# Patient Record
Sex: FEMALE | Race: BLACK OR AFRICAN AMERICAN | NOT HISPANIC OR LATINO | Employment: FULL TIME | URBAN - METROPOLITAN AREA
[De-identification: names, ages, dates, MRNs, and addresses within clinical notes are randomized per-mention and may not be internally consistent; named-entity substitution may affect disease eponyms.]

---

## 2017-08-14 LAB
BILIRUB SERPL-MCNC: NEGATIVE MG/DL
BLOOD URINE, POC: NEGATIVE
GLUCOSE UR QL STRIP: NEGATIVE
KETONES UR QL STRIP: NEGATIVE
LEUKOCYTE EST, POC UA: NEGATIVE
NITRITE, POC UA: NEGATIVE
PH, POC UA: 5
POC BETA-HCG (QUAL): NEGATIVE
PROTEIN, POC: NEGATIVE
SPECIFIC GRAVITY, POC UA: 1.02
UROBILINOGEN, POC UA: NORMAL

## 2018-08-27 LAB — POC BETA-HCG (QUAL): NEGATIVE

## 2018-08-28 ENCOUNTER — HISTORICAL (OUTPATIENT)
Dept: LAB | Facility: HOSPITAL | Age: 33
End: 2018-08-28

## 2018-10-04 ENCOUNTER — HISTORICAL (OUTPATIENT)
Dept: SURGERY | Facility: HOSPITAL | Age: 33
End: 2018-10-04

## 2018-10-04 LAB
ABS NEUT (OLG): 3.7 X10(3)/MCL (ref 1.5–6.9)
ALBUMIN SERPL-MCNC: 3.7 GM/DL (ref 3.4–5)
ALBUMIN/GLOB SERPL: 1 RATIO
ALP SERPL-CCNC: 29 UNIT/L (ref 30–113)
ALT SERPL-CCNC: 23 UNIT/L (ref 10–45)
APTT PPP: 24.5 SECOND(S) (ref 25–35)
AST SERPL-CCNC: 24 UNIT/L (ref 15–37)
BASOPHILS # BLD AUTO: 0.1 X10(3)/MCL (ref 0–0.1)
BASOPHILS NFR BLD AUTO: 1 % (ref 0–1)
BILIRUB SERPL-MCNC: 0.3 MG/DL (ref 0.1–0.9)
BILIRUBIN DIRECT+TOT PNL SERPL-MCNC: 0.1 MG/DL (ref 0–0.3)
BILIRUBIN DIRECT+TOT PNL SERPL-MCNC: 0.2 MG/DL
BUN SERPL-MCNC: 8 MG/DL (ref 10–20)
CALCIUM SERPL-MCNC: 8.8 MG/DL (ref 8–10.5)
CHLORIDE SERPL-SCNC: 103 MMOL/L (ref 100–108)
CO2 SERPL-SCNC: 31 MMOL/L (ref 21–35)
CREAT SERPL-MCNC: 0.79 MG/DL (ref 0.7–1.3)
EOSINOPHIL # BLD AUTO: 0 X10(3)/MCL (ref 0–0.6)
EOSINOPHIL NFR BLD AUTO: 0 % (ref 0–5)
ERYTHROCYTE [DISTWIDTH] IN BLOOD BY AUTOMATED COUNT: 12.9 % (ref 11.5–17)
GLOBULIN SER-MCNC: 3.8 GM/DL
GLUCOSE SERPL-MCNC: 84 MG/DL (ref 75–116)
HCT VFR BLD AUTO: 43.9 % (ref 36–48)
HGB BLD-MCNC: 14 GM/DL (ref 12–16)
IMM GRANULOCYTES # BLD AUTO: 0.02 10*3/UL (ref 0–0.02)
IMM GRANULOCYTES NFR BLD AUTO: 0.3 % (ref 0–0.43)
INR PPP: 1 (ref 0–1.2)
LYMPHOCYTES # BLD AUTO: 2.7 X10(3)/MCL (ref 0.5–4.1)
LYMPHOCYTES NFR BLD AUTO: 40 % (ref 15–40)
MCH RBC QN AUTO: 28 PG (ref 27–34)
MCHC RBC AUTO-ENTMCNC: 32 GM/DL (ref 31–36)
MCV RBC AUTO: 87 FL (ref 80–99)
MONOCYTES # BLD AUTO: 0.4 X10(3)/MCL (ref 0–1.1)
MONOCYTES NFR BLD AUTO: 6 % (ref 4–12)
NEUTROPHILS # BLD AUTO: 3.7 X10(3)/MCL (ref 1.5–6.9)
NEUTROPHILS NFR BLD AUTO: 53 % (ref 43–75)
PLATELET # BLD AUTO: 289 X10(3)/MCL (ref 140–400)
PMV BLD AUTO: 11 FL (ref 6.8–10)
POTASSIUM SERPL-SCNC: 4.1 MMOL/L (ref 3.6–5.2)
PROT SERPL-MCNC: 7.5 GM/DL (ref 6.4–8.2)
PROTHROMBIN TIME: 10.3 SECOND(S) (ref 9–12)
RBC # BLD AUTO: 5.05 X10(6)/MCL (ref 4.2–5.4)
SODIUM SERPL-SCNC: 140 MMOL/L (ref 135–145)
WBC # SPEC AUTO: 6.9 X10(3)/MCL (ref 4.5–11.5)

## 2018-10-08 ENCOUNTER — HISTORICAL (OUTPATIENT)
Dept: ANESTHESIOLOGY | Facility: HOSPITAL | Age: 33
End: 2018-10-08

## 2018-10-08 LAB — B-HCG SERPL QL: NEGATIVE

## 2018-10-11 ENCOUNTER — HISTORICAL (OUTPATIENT)
Dept: MEDSURG UNIT | Facility: HOSPITAL | Age: 33
End: 2018-10-11

## 2019-03-22 ENCOUNTER — HISTORICAL (OUTPATIENT)
Dept: INTERNAL MEDICINE | Facility: CLINIC | Age: 34
End: 2019-03-22

## 2019-03-22 LAB
ABS NEUT (OLG): 2.49 X10(3)/MCL (ref 2.1–9.2)
ALBUMIN SERPL-MCNC: 3.8 GM/DL (ref 3.4–5)
ALBUMIN/GLOB SERPL: 1 RATIO (ref 1.1–2)
ALP SERPL-CCNC: 30 UNIT/L (ref 45–117)
ALT SERPL-CCNC: 15 UNIT/L (ref 12–78)
AST SERPL-CCNC: 15 UNIT/L (ref 15–37)
BASOPHILS # BLD AUTO: 0.04 X10(3)/MCL
BASOPHILS NFR BLD AUTO: 1 %
BILIRUB SERPL-MCNC: 0.4 MG/DL (ref 0.2–1)
BILIRUBIN DIRECT+TOT PNL SERPL-MCNC: 0.1 MG/DL
BILIRUBIN DIRECT+TOT PNL SERPL-MCNC: 0.3 MG/DL
BUN SERPL-MCNC: 12 MG/DL (ref 7–18)
CALCIUM SERPL-MCNC: 8.8 MG/DL (ref 8.5–10.1)
CHLORIDE SERPL-SCNC: 109 MMOL/L (ref 98–107)
CO2 SERPL-SCNC: 28 MMOL/L (ref 21–32)
CREAT SERPL-MCNC: 0.7 MG/DL (ref 0.6–1.3)
EOSINOPHIL # BLD AUTO: 0.09 X10(3)/MCL
EOSINOPHIL NFR BLD AUTO: 2 %
ERYTHROCYTE [DISTWIDTH] IN BLOOD BY AUTOMATED COUNT: 12.2 % (ref 11.5–14.5)
GLOBULIN SER-MCNC: 3.8 GM/ML (ref 2.3–3.5)
GLUCOSE SERPL-MCNC: 94 MG/DL (ref 74–106)
HCT VFR BLD AUTO: 40.9 % (ref 35–46)
HGB BLD-MCNC: 13.3 GM/DL (ref 12–16)
IMM GRANULOCYTES # BLD AUTO: 0.01 10*3/UL
IMM GRANULOCYTES NFR BLD AUTO: 0 %
LYMPHOCYTES # BLD AUTO: 1.89 X10(3)/MCL
LYMPHOCYTES NFR BLD AUTO: 38 % (ref 13–40)
MCH RBC QN AUTO: 28.3 PG (ref 26–34)
MCHC RBC AUTO-ENTMCNC: 32.5 GM/DL (ref 31–37)
MCV RBC AUTO: 87 FL (ref 80–100)
MONOCYTES # BLD AUTO: 0.4 X10(3)/MCL
MONOCYTES NFR BLD AUTO: 8 % (ref 4–12)
NEUTROPHILS # BLD AUTO: 2.49 X10(3)/MCL
NEUTROPHILS NFR BLD AUTO: 51 X10(3)/MCL
PLATELET # BLD AUTO: 219 X10(3)/MCL (ref 130–400)
PMV BLD AUTO: 10.5 FL (ref 7.4–10.4)
POTASSIUM SERPL-SCNC: 4.2 MMOL/L (ref 3.5–5.1)
PROT SERPL-MCNC: 7.6 GM/DL (ref 6.4–8.2)
RBC # BLD AUTO: 4.7 X10(6)/MCL (ref 4–5.2)
SODIUM SERPL-SCNC: 139 MMOL/L (ref 136–145)
T4 FREE SERPL-MCNC: 1.16 NG/DL (ref 0.76–1.46)
TSH SERPL-ACNC: 0.83 MIU/L (ref 0.36–3.74)
WBC # SPEC AUTO: 4.9 X10(3)/MCL (ref 4.5–11)

## 2019-04-23 ENCOUNTER — HISTORICAL (OUTPATIENT)
Dept: ADMINISTRATIVE | Facility: HOSPITAL | Age: 34
End: 2019-04-23

## 2019-10-08 ENCOUNTER — HISTORICAL (OUTPATIENT)
Dept: ADMINISTRATIVE | Facility: HOSPITAL | Age: 34
End: 2019-10-08

## 2020-01-22 ENCOUNTER — HISTORICAL (OUTPATIENT)
Dept: ADMINISTRATIVE | Facility: HOSPITAL | Age: 35
End: 2020-01-22

## 2020-05-14 LAB — POC BETA-HCG (QUAL): NEGATIVE

## 2020-05-28 ENCOUNTER — HISTORICAL (OUTPATIENT)
Dept: RADIOLOGY | Facility: HOSPITAL | Age: 35
End: 2020-05-28

## 2020-07-13 ENCOUNTER — HISTORICAL (OUTPATIENT)
Dept: RADIOLOGY | Facility: HOSPITAL | Age: 35
End: 2020-07-13

## 2021-01-11 ENCOUNTER — HISTORICAL (OUTPATIENT)
Dept: ADMINISTRATIVE | Facility: HOSPITAL | Age: 36
End: 2021-01-11

## 2021-01-27 ENCOUNTER — HISTORICAL (OUTPATIENT)
Dept: RADIOLOGY | Facility: HOSPITAL | Age: 36
End: 2021-01-27

## 2021-01-27 LAB
ABS NEUT (OLG): 2.76 X10(3)/MCL (ref 2.1–9.2)
ALBUMIN SERPL-MCNC: 4.1 GM/DL (ref 3.5–5)
ALBUMIN/GLOB SERPL: 1.2 RATIO (ref 1.1–2)
ALP SERPL-CCNC: 33 UNIT/L (ref 40–150)
ALT SERPL-CCNC: 11 UNIT/L (ref 0–55)
AST SERPL-CCNC: 28 UNIT/L (ref 5–34)
BASOPHILS # BLD AUTO: 0 X10(3)/MCL (ref 0–0.2)
BASOPHILS NFR BLD AUTO: 0 %
BILIRUB SERPL-MCNC: 0.5 MG/DL
BILIRUBIN DIRECT+TOT PNL SERPL-MCNC: 0.2 MG/DL (ref 0–0.5)
BILIRUBIN DIRECT+TOT PNL SERPL-MCNC: 0.3 MG/DL (ref 0–0.8)
BUN SERPL-MCNC: 10 MG/DL (ref 7–18.7)
CALCIUM SERPL-MCNC: 9.1 MG/DL (ref 8.4–10.2)
CHLORIDE SERPL-SCNC: 108 MMOL/L (ref 98–107)
CO2 SERPL-SCNC: 22 MMOL/L (ref 22–29)
CREAT SERPL-MCNC: 0.7 MG/DL (ref 0.55–1.02)
EOSINOPHIL # BLD AUTO: 0.1 X10(3)/MCL (ref 0–0.9)
EOSINOPHIL NFR BLD AUTO: 1 %
ERYTHROCYTE [DISTWIDTH] IN BLOOD BY AUTOMATED COUNT: 13.1 % (ref 11.5–14.5)
GLOBULIN SER-MCNC: 3.3 GM/DL (ref 2.4–3.5)
GLUCOSE SERPL-MCNC: 86 MG/DL (ref 74–100)
HCT VFR BLD AUTO: 37.1 % (ref 35–46)
HGB BLD-MCNC: 12.1 GM/DL (ref 12–16)
IMM GRANULOCYTES # BLD AUTO: 0.01 10*3/UL
IMM GRANULOCYTES NFR BLD AUTO: 0 %
LYMPHOCYTES # BLD AUTO: 2.5 X10(3)/MCL (ref 0.6–4.6)
LYMPHOCYTES NFR BLD AUTO: 43 %
MCH RBC QN AUTO: 27.8 PG (ref 26–34)
MCHC RBC AUTO-ENTMCNC: 32.6 GM/DL (ref 31–37)
MCV RBC AUTO: 85.1 FL (ref 80–100)
MONOCYTES # BLD AUTO: 0.4 X10(3)/MCL (ref 0.1–1.3)
MONOCYTES NFR BLD AUTO: 8 %
NEUTROPHILS # BLD AUTO: 2.76 X10(3)/MCL (ref 2.1–9.2)
NEUTROPHILS NFR BLD AUTO: 48 %
PLATELET # BLD AUTO: 236 X10(3)/MCL (ref 130–400)
PMV BLD AUTO: 10.6 FL (ref 7.4–10.4)
POTASSIUM SERPL-SCNC: 3.6 MMOL/L (ref 3.5–5.1)
PROT SERPL-MCNC: 7.4 GM/DL (ref 6.4–8.3)
RBC # BLD AUTO: 4.36 X10(6)/MCL (ref 4–5.2)
SODIUM SERPL-SCNC: 141 MMOL/L (ref 136–145)
T4 FREE SERPL-MCNC: 0.92 NG/DL (ref 0.7–1.48)
TSH SERPL-ACNC: 0.74 UIU/ML (ref 0.35–4.94)
WBC # SPEC AUTO: 5.8 X10(3)/MCL (ref 4.5–11)

## 2021-02-17 ENCOUNTER — HISTORICAL (OUTPATIENT)
Dept: ADMINISTRATIVE | Facility: HOSPITAL | Age: 36
End: 2021-02-17

## 2021-02-17 LAB — SARS-COV-2 AG RESP QL IA.RAPID: NEGATIVE

## 2021-02-18 ENCOUNTER — HISTORICAL (OUTPATIENT)
Dept: SURGERY | Facility: HOSPITAL | Age: 36
End: 2021-02-18

## 2022-04-10 ENCOUNTER — HISTORICAL (OUTPATIENT)
Dept: ADMINISTRATIVE | Facility: HOSPITAL | Age: 37
End: 2022-04-10

## 2022-04-25 VITALS
HEIGHT: 64 IN | BODY MASS INDEX: 28.56 KG/M2 | WEIGHT: 167.31 LBS | SYSTOLIC BLOOD PRESSURE: 129 MMHG | DIASTOLIC BLOOD PRESSURE: 84 MMHG | OXYGEN SATURATION: 100 %

## 2022-05-03 NOTE — HISTORICAL [facility]
This is a historical note converted from [facility]. Formatting and pictures may have been removed.  Please reference [facility] for original formatting and attached multimedia. DATE OF SURGERY:?02/18/21  ?  PREOPERATIVE DIAGNOSES:  1.?Left?lateral?Meniscal tear  2, Left knee lateral parameniscal cyst  ?   POSTOPERATIVE DIAGNOSES:  ?   Same  ?   PROCEDURE:  1.?Diagnostic Arthroscopy  2. Partial lateral meniscectomy  3. Lateral parameniscal cyst decompression  ?  ATTENDING PHYSICIAN:?Fabian Mcintyre MD  ?  RESIDENT PHYSICIANS: Dr Sunday Keith PGY3  ?  ANESTHESIA: Regional + General  ?  EBL: 20cc  ?  TOURNIQUET:?None  ?  SPECIMEN: none  ?  COMPLICATIONS: none  ?  DISPOSITION: To recovery room in stable condition  ?  FINDINGS:  ?  Effusion: trace  Patella: Grade?1 with?grade 3?chondromalacia on medical facet  Trochlea: Grade 2 chondromalacia  Medial compartment: Grade 1 chondromalacia  Medial meniscus: intact  Lateral compartment: Grade 1 chondromalacia?  Lateral meniscus: radial and horizontal cleavage tear  ACL:?Normal  PCL: Normal  Other: large lateral parameniscal cyst  ?  ?  INDICATIONS FOR PROCEDURE?Talia Clay?is a?35 Years?Female?patient who reports ongoing pain to the knee.??The patient has exhausted conservative measures and?has not appreciated any relief of pain or improvement in function.? The history, examination, and imaging were consistent with mechanical symptoms related to?a meniscal tear.? The risks, benefits, outcomes, and alternatives of conservative vs surgical intervention were discussed with the patient and they elected to proceed with surgical intervention.  ?  PROCEDURE IN DETAIL  The patient was given preoperative antibiotics for prophylaxis against infection.? The patient was taken to the operating room and placed supine on the OR table.? After adequate general anesthesia, an exam was performed, the knee was stable to anterior and posterior drawer.? Negative pivot shift.? A well-padded proximal  thigh tourniquet was placed. The patient was placed in ACL leg andrade and the lower extremity was prepped and draped in the standard sterile fashion.? Local anesthetic was injected at the proposed incisions.?  ?  A standard anterolateral portal was made, and the arthroscope was introduced.? Under direct vision with the use of a spinal needle an anteromedial portal was made, and a probe was placed in the knee.? A thorough diagnostic arthroscopy was performed, and the findings noted above were seen.?  ?  Lateral meniscus  ?  We introduced our shaver into the lateral compartment with the leg in the figure-4 position.? Using the oscillating mode of the shaver we resected the torn meniscal tissue back to a stable margin. The meniscus was again tensioned with a probe and confirmed to be stable.  ?  We then turned our attention to the lateral parameniscal cyst.? We introduced a spinal needle laterally through the cyst.? Under direct visualization the cyst was decompressed.? We passed the needle through capsule multiple times to ensure decompression of any subcompartments.  ?  The arthroscopic fluid and instruments were removed.?The skin and portals were closed with 2-0 monocryl suture. A dry, sterile dressing was placed. The sponge needle and instrument counts were correct at the end of the case. The patient tolerated the procedure well, was extubated, and was taken to recovery in stable condition.  ?  POSTOPERATIVE PLAN:? Patient may remove dressing on POD?4 however will leave the steristrips in place.? The patient may return to unrestricted activity as tolerated in a graduated fashion.? A follow up appointment will be made for the patient before leaving the hospital?to see us in clinic in 1-2 weeks.  ?

## 2022-08-13 ENCOUNTER — OFFICE VISIT (OUTPATIENT)
Dept: URGENT CARE | Facility: CLINIC | Age: 37
End: 2022-08-13

## 2022-08-13 VITALS
DIASTOLIC BLOOD PRESSURE: 86 MMHG | BODY MASS INDEX: 25.56 KG/M2 | HEIGHT: 65 IN | TEMPERATURE: 100 F | SYSTOLIC BLOOD PRESSURE: 121 MMHG | WEIGHT: 153.44 LBS | HEART RATE: 72 BPM | OXYGEN SATURATION: 100 % | RESPIRATION RATE: 18 BRPM
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
## 2022-08-13 PROCEDURE — 99214 OFFICE O/P EST MOD 30 MIN: CPT | Mod: S$PBB,,, | Performed by: FAMILY MEDICINE

## 2022-08-13 PROCEDURE — 99214 OFFICE O/P EST MOD 30 MIN: CPT | Mod: PBBFAC | Performed by: FAMILY MEDICINE

## 2022-08-13 PROCEDURE — 99214 PR OFFICE/OUTPT VISIT, EST, LEVL IV, 30-39 MIN: ICD-10-PCS | Mod: S$PBB,,, | Performed by: FAMILY MEDICINE

## 2022-08-13 RX ORDER — CYCLOBENZAPRINE HCL 10 MG
10 TABLET ORAL 3 TIMES DAILY PRN
Qty: 15 TABLET | Refills: 1 | Status: SHIPPED | OUTPATIENT
Start: 2022-08-13 | End: 2022-08-23

## 2022-08-13 RX ORDER — OXYCODONE AND ACETAMINOPHEN 5; 325 MG/1; MG/1
1 TABLET ORAL EVERY 6 HOURS PRN
COMMUNITY
Start: 2022-06-22 | End: 2023-09-20

## 2022-08-13 RX ORDER — NORGESTIMATE AND ETHINYL ESTRADIOL 7DAYSX3 28
1 KIT ORAL DAILY
COMMUNITY
Start: 2022-07-15 | End: 2023-06-15 | Stop reason: SDUPTHER
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
## 2022-09-18 ENCOUNTER — HISTORICAL (OUTPATIENT)
Dept: ADMINISTRATIVE | Facility: HOSPITAL | Age: 37
End: 2022-09-18

## 2022-09-20 ENCOUNTER — HISTORICAL (OUTPATIENT)
Dept: ADMINISTRATIVE | Facility: HOSPITAL | Age: 37
End: 2022-09-20

## 2022-09-21 ENCOUNTER — HISTORICAL (OUTPATIENT)
Dept: ADMINISTRATIVE | Facility: HOSPITAL | Age: 37
End: 2022-09-21

## 2023-06-15 ENCOUNTER — OFFICE VISIT (OUTPATIENT)
Dept: INTERNAL MEDICINE | Facility: CLINIC | Age: 38
End: 2023-06-15
Payer: MEDICAID

## 2023-06-15 VITALS
HEIGHT: 64 IN | WEIGHT: 158.63 LBS | RESPIRATION RATE: 18 BRPM | HEART RATE: 62 BPM | DIASTOLIC BLOOD PRESSURE: 75 MMHG | BODY MASS INDEX: 27.08 KG/M2 | SYSTOLIC BLOOD PRESSURE: 111 MMHG | TEMPERATURE: 99 F

## 2023-06-15 DIAGNOSIS — Z00.00 WELLNESS EXAMINATION: Primary | ICD-10-CM

## 2023-06-15 PROCEDURE — 99213 OFFICE O/P EST LOW 20 MIN: CPT | Mod: PBBFAC | Performed by: INTERNAL MEDICINE

## 2023-06-15 RX ORDER — NORGESTIMATE AND ETHINYL ESTRADIOL 7DAYSX3 28
1 KIT ORAL DAILY
Qty: 30 TABLET | Refills: 5 | Status: SHIPPED | OUTPATIENT
Start: 2023-06-15 | End: 2023-10-23

## 2023-06-15 NOTE — PROGRESS NOTES
[facility] INTERNAL MEDICINE  OUTPATIENT OFFICE VISIT NOTE    SUBJECTIVE:   CC- F/U  HPI: Ms Melgar comes in today to establish care.  She is doing well. Requesting referral to gyn    ROS:  CONSTITUTIONAL: Denies weight loss, fever and chills.  HEENT: Denies changes in vision and hearing.  ?RESPIRATORY: Denies SOB and cough.?  CV: Denies palpitations and CP. ?  GI: Denies abdominal pain, nausea, vomiting and diarrhea.?  : Denies dysuria and urinary frequency.?  MSK: Denies myalgia and joint pain.?  SKIN: Denies rash and pruritus.  ?NEUROLOGICAL: Denies headache and syncope.?  PSYCHIATRIC: Denies recent changes in mood. Denies anxiety and depression.     OBJECTIVE:     Physical Examination:    Vital signs:     Vitals:    06/15/23 1322   BP: 111/75   Pulse: 62   Resp: 18   Temp: 98.5 °F (36.9 °C)        General: Well nourished w/o distress  HEENT: NC/AT; PERRLA; nasal and oral mucosa moist and clear; no sinus tenderness; no thyromegaly  Neck: Full ROM; no lymphadenopathy  Pulm: CTA bilaterally, normal work of breathing  CV: S1, S2 w/o murmurs or gallops; no edema noted  GI: Soft with normal bowel sounds in all quadrants, no masses on palpation  MSK: Full ROM of all extremities and spine w/o limitation or discomfort  Derm: No rashes, abnormal bruising, or skin lesions  Neuro: AAOx4; CN II-XII intact; motor/sensory function intact  Psych: Cooperative; appropriate mood and affect           ASSESSMENT & PLAN:   Wellness  Labs ordered  Refills given for OCPs  Referred to gyn          Follow up in about 6 months (around 12/15/2023).     Anusha Lopez MD

## 2023-09-20 ENCOUNTER — OFFICE VISIT (OUTPATIENT)
Dept: BEHAVIORAL HEALTH | Facility: CLINIC | Age: 38
End: 2023-09-20
Payer: MEDICAID

## 2023-09-20 VITALS
HEIGHT: 64 IN | DIASTOLIC BLOOD PRESSURE: 53 MMHG | BODY MASS INDEX: 27.08 KG/M2 | HEART RATE: 68 BPM | SYSTOLIC BLOOD PRESSURE: 106 MMHG | TEMPERATURE: 98 F | WEIGHT: 158.63 LBS

## 2023-09-20 DIAGNOSIS — F51.04 PSYCHOPHYSIOLOGICAL INSOMNIA: Primary | ICD-10-CM

## 2023-09-20 DIAGNOSIS — F41.1 GAD (GENERALIZED ANXIETY DISORDER): ICD-10-CM

## 2023-09-20 DIAGNOSIS — F33.1 MODERATE EPISODE OF RECURRENT MAJOR DEPRESSIVE DISORDER: ICD-10-CM

## 2023-09-20 PROCEDURE — 3074F SYST BP LT 130 MM HG: CPT | Mod: CPTII,,, | Performed by: NURSE PRACTITIONER

## 2023-09-20 PROCEDURE — 3078F PR MOST RECENT DIASTOLIC BLOOD PRESSURE < 80 MM HG: ICD-10-PCS | Mod: CPTII,,, | Performed by: NURSE PRACTITIONER

## 2023-09-20 PROCEDURE — 1160F PR REVIEW ALL MEDS BY PRESCRIBER/CLIN PHARMACIST DOCUMENTED: ICD-10-PCS | Mod: CPTII,,, | Performed by: NURSE PRACTITIONER

## 2023-09-20 PROCEDURE — 3078F DIAST BP <80 MM HG: CPT | Mod: CPTII,,, | Performed by: NURSE PRACTITIONER

## 2023-09-20 PROCEDURE — 3008F BODY MASS INDEX DOCD: CPT | Mod: CPTII,,, | Performed by: NURSE PRACTITIONER

## 2023-09-20 PROCEDURE — 1159F MED LIST DOCD IN RCRD: CPT | Mod: CPTII,,, | Performed by: NURSE PRACTITIONER

## 2023-09-20 PROCEDURE — 1160F RVW MEDS BY RX/DR IN RCRD: CPT | Mod: CPTII,,, | Performed by: NURSE PRACTITIONER

## 2023-09-20 PROCEDURE — 99213 OFFICE O/P EST LOW 20 MIN: CPT | Mod: PBBFAC,PN | Performed by: NURSE PRACTITIONER

## 2023-09-20 PROCEDURE — 1159F PR MEDICATION LIST DOCUMENTED IN MEDICAL RECORD: ICD-10-PCS | Mod: CPTII,,, | Performed by: NURSE PRACTITIONER

## 2023-09-20 PROCEDURE — 3074F PR MOST RECENT SYSTOLIC BLOOD PRESSURE < 130 MM HG: ICD-10-PCS | Mod: CPTII,,, | Performed by: NURSE PRACTITIONER

## 2023-09-20 PROCEDURE — 99215 OFFICE O/P EST HI 40 MIN: CPT | Mod: SA,HB,S$PBB, | Performed by: NURSE PRACTITIONER

## 2023-09-20 PROCEDURE — 99215 PR OFFICE/OUTPT VISIT, EST, LEVL V, 40-54 MIN: ICD-10-PCS | Mod: SA,HB,S$PBB, | Performed by: NURSE PRACTITIONER

## 2023-09-20 PROCEDURE — 3008F PR BODY MASS INDEX (BMI) DOCUMENTED: ICD-10-PCS | Mod: CPTII,,, | Performed by: NURSE PRACTITIONER

## 2023-09-20 RX ORDER — ESZOPICLONE 1 MG/1
1 TABLET, FILM COATED ORAL NIGHTLY
Qty: 60 TABLET | Refills: 1 | Status: SHIPPED | OUTPATIENT
Start: 2023-09-20 | End: 2023-09-27 | Stop reason: ALTCHOICE

## 2023-09-20 RX ORDER — BUSPIRONE HYDROCHLORIDE 5 MG/1
5 TABLET ORAL 3 TIMES DAILY
Qty: 90 TABLET | Refills: 3 | Status: SHIPPED | OUTPATIENT
Start: 2023-09-20

## 2023-09-20 RX ORDER — SERTRALINE HYDROCHLORIDE 50 MG/1
50 TABLET, FILM COATED ORAL DAILY
Qty: 30 TABLET | Refills: 3 | Status: SHIPPED | OUTPATIENT
Start: 2023-09-20 | End: 2023-10-23
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
## 2023-09-20 NOTE — PATIENT INSTRUCTIONS
Start Zoloft 50mg at HS  2. Start Buspar 5mg TID  3. Start Lunesta 1 to 2mg a night for sleep.  May take 1mg before bed and 1mg in the middle of the night.

## 2023-09-27 ENCOUNTER — PATIENT MESSAGE (OUTPATIENT)
Dept: BEHAVIORAL HEALTH | Facility: CLINIC | Age: 38
End: 2023-09-27
Payer: MEDICAID

## 2023-09-27 RX ORDER — ZOLPIDEM TARTRATE 6.25 MG/1
6.25 TABLET, FILM COATED, EXTENDED RELEASE ORAL NIGHTLY
Qty: 30 TABLET | Refills: 0 | Status: SHIPPED | OUTPATIENT
Start: 2023-09-27 | End: 2023-10-20

## 2023-10-20 ENCOUNTER — PATIENT MESSAGE (OUTPATIENT)
Dept: BEHAVIORAL HEALTH | Facility: CLINIC | Age: 38
End: 2023-10-20
Payer: MEDICAID

## 2023-10-20 DIAGNOSIS — F51.04 PSYCHOPHYSIOLOGICAL INSOMNIA: Primary | ICD-10-CM

## 2023-10-20 RX ORDER — ESZOPICLONE 1 MG/1
1 TABLET, FILM COATED ORAL NIGHTLY
Qty: 30 TABLET | Refills: 0 | Status: SHIPPED | OUTPATIENT
Start: 2023-10-20 | End: 2023-10-23 | Stop reason: DRUGHIGH

## 2023-10-23 ENCOUNTER — OFFICE VISIT (OUTPATIENT)
Dept: BEHAVIORAL HEALTH | Facility: CLINIC | Age: 38
End: 2023-10-23
Payer: MEDICAID

## 2023-10-23 DIAGNOSIS — G47.00 INSOMNIA, UNSPECIFIED TYPE: Primary | ICD-10-CM

## 2023-10-23 PROCEDURE — 99214 OFFICE O/P EST MOD 30 MIN: CPT | Mod: SA,HB,S$PBB,NDTC | Performed by: NURSE PRACTITIONER

## 2023-10-23 PROCEDURE — 1159F PR MEDICATION LIST DOCUMENTED IN MEDICAL RECORD: ICD-10-PCS | Mod: CPTII,NDTC,, | Performed by: NURSE PRACTITIONER

## 2023-10-23 PROCEDURE — 1160F PR REVIEW ALL MEDS BY PRESCRIBER/CLIN PHARMACIST DOCUMENTED: ICD-10-PCS | Mod: CPTII,NDTC,, | Performed by: NURSE PRACTITIONER

## 2023-10-23 PROCEDURE — 1159F MED LIST DOCD IN RCRD: CPT | Mod: CPTII,NDTC,, | Performed by: NURSE PRACTITIONER

## 2023-10-23 PROCEDURE — 1160F RVW MEDS BY RX/DR IN RCRD: CPT | Mod: CPTII,NDTC,, | Performed by: NURSE PRACTITIONER

## 2023-10-23 PROCEDURE — 99214 PR OFFICE/OUTPT VISIT, EST, LEVL IV, 30-39 MIN: ICD-10-PCS | Mod: SA,HB,S$PBB,NDTC | Performed by: NURSE PRACTITIONER

## 2023-10-23 RX ORDER — ESZOPICLONE 1 MG/1
1 TABLET, FILM COATED ORAL NIGHTLY
Qty: 7 TABLET | Refills: 0 | Status: SHIPPED | OUTPATIENT
Start: 2023-10-23 | End: 2024-01-16 | Stop reason: DRUGHIGH
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
## 2024-01-15 ENCOUNTER — PATIENT MESSAGE (OUTPATIENT)
Dept: BEHAVIORAL HEALTH | Facility: CLINIC | Age: 39
End: 2024-01-15
Payer: MEDICAID

## 2024-01-15 DIAGNOSIS — F51.04 PSYCHOPHYSIOLOGICAL INSOMNIA: Primary | ICD-10-CM

## 2024-01-16 RX ORDER — ESZOPICLONE 3 MG/1
3 TABLET, FILM COATED ORAL NIGHTLY
Qty: 30 TABLET | Refills: 3 | Status: SHIPPED | OUTPATIENT
Start: 2024-01-16 | End: 2024-02-21 | Stop reason: SDUPTHER

## 2024-02-14 ENCOUNTER — PATIENT MESSAGE (OUTPATIENT)
Dept: BEHAVIORAL HEALTH | Facility: CLINIC | Age: 39
End: 2024-02-14
Payer: MEDICAID

## 2024-02-21 ENCOUNTER — TELEPHONE (OUTPATIENT)
Dept: BEHAVIORAL HEALTH | Facility: CLINIC | Age: 39
End: 2024-02-21
Payer: MEDICAID

## 2024-02-21 DIAGNOSIS — F51.04 PSYCHOPHYSIOLOGICAL INSOMNIA: ICD-10-CM

## 2024-02-21 DIAGNOSIS — G47.00 INSOMNIA DISORDER, WITH NON-SLEEP DISORDER MENTAL COMORBIDITY: Primary | ICD-10-CM

## 2024-02-21 DIAGNOSIS — F51.02 INSOMNIA DUE TO PSYCHOLOGICAL STRESS: ICD-10-CM

## 2024-02-21 RX ORDER — ESZOPICLONE 3 MG/1
3 TABLET, FILM COATED ORAL NIGHTLY
Qty: 30 TABLET | Refills: 3 | Status: SHIPPED | OUTPATIENT
Start: 2024-02-21

## 2024-02-21 NOTE — TELEPHONE ENCOUNTER
Pt called today checking on the status of the PA for Lunesta 3mg.  I informed her that I have sent it off twice and there was still no decision. [employer] was asking for a CPT code. Information was given to Ms. Crow to call them..

## 2024-02-21 NOTE — TELEPHONE ENCOUNTER
Resent Rx tor Lunesta 3mg to her pharmacy with 3 different Dx codes. Will get in touch with [employer] if needed.

## 2024-02-23 NOTE — TELEPHONE ENCOUNTER
Spoke to pt, I informed her that the PA had been approved for Lunesta 3 mg. Pt verbalized understanding

## 2024-04-09 RX ORDER — DROSPIRENONE 4 MG/1
1 TABLET, FILM COATED ORAL
COMMUNITY
Start: 2024-02-15 | End: 2024-05-24 | Stop reason: ALTCHOICE

## 2024-04-11 ENCOUNTER — LAB VISIT (OUTPATIENT)
Dept: LAB | Facility: HOSPITAL | Age: 39
End: 2024-04-11
Payer: MEDICAID

## 2024-04-11 ENCOUNTER — OFFICE VISIT (OUTPATIENT)
Dept: GYNECOLOGY | Facility: CLINIC | Age: 39
End: 2024-04-11
Payer: MEDICAID

## 2024-04-11 ENCOUNTER — TELEPHONE (OUTPATIENT)
Dept: GYNECOLOGY | Facility: CLINIC | Age: 39
End: 2024-04-11

## 2024-04-11 VITALS
WEIGHT: 159.19 LBS | HEIGHT: 63 IN | HEART RATE: 87 BPM | OXYGEN SATURATION: 100 % | RESPIRATION RATE: 16 BRPM | DIASTOLIC BLOOD PRESSURE: 64 MMHG | SYSTOLIC BLOOD PRESSURE: 132 MMHG | TEMPERATURE: 98 F | BODY MASS INDEX: 28.21 KG/M2

## 2024-04-11 DIAGNOSIS — N93.9 ABNORMAL UTERINE BLEEDING: Primary | ICD-10-CM

## 2024-04-11 DIAGNOSIS — N76.0 BACTERIAL VAGINOSIS: Primary | ICD-10-CM

## 2024-04-11 DIAGNOSIS — N93.9 ABNORMAL UTERINE BLEEDING: ICD-10-CM

## 2024-04-11 DIAGNOSIS — Z00.00 WELLNESS EXAMINATION: ICD-10-CM

## 2024-04-11 DIAGNOSIS — Z12.4 ENCOUNTER FOR PAPANICOLAOU SMEAR FOR CERVICAL CANCER SCREENING: ICD-10-CM

## 2024-04-11 DIAGNOSIS — Z12.31 ENCOUNTER FOR SCREENING MAMMOGRAM FOR BREAST CANCER: ICD-10-CM

## 2024-04-11 DIAGNOSIS — B96.89 BACTERIAL VAGINOSIS: Primary | ICD-10-CM

## 2024-04-11 LAB
ALBUMIN SERPL-MCNC: 4.3 G/DL (ref 3.5–5)
ALBUMIN/GLOB SERPL: 1.4 RATIO (ref 1.1–2)
ALP SERPL-CCNC: 42 UNIT/L (ref 40–150)
ALT SERPL-CCNC: 20 UNIT/L (ref 0–55)
AST SERPL-CCNC: 27 UNIT/L (ref 5–34)
B-HCG UR QL: NEGATIVE
BILIRUB SERPL-MCNC: 0.7 MG/DL
BUN SERPL-MCNC: 11.4 MG/DL (ref 7–18.7)
C TRACH DNA SPEC QL NAA+PROBE: NOT DETECTED
CALCIUM SERPL-MCNC: 9.7 MG/DL (ref 8.4–10.2)
CHLORIDE SERPL-SCNC: 110 MMOL/L (ref 98–107)
CHOLEST SERPL-MCNC: 135 MG/DL
CHOLEST/HDLC SERPL: 3 {RATIO} (ref 0–5)
CLUE CELLS VAG QL WET PREP: ABNORMAL
CO2 SERPL-SCNC: 24 MMOL/L (ref 22–29)
CREAT SERPL-MCNC: 0.77 MG/DL (ref 0.55–1.02)
CTP QC/QA: YES
EST. AVERAGE GLUCOSE BLD GHB EST-MCNC: 96.8 MG/DL
FSH SERPL-ACNC: 5.04 MIU/ML
GFR SERPLBLD CREATININE-BSD FMLA CKD-EPI: >60 MLS/MIN/1.73/M2
GLOBULIN SER-MCNC: 3 GM/DL (ref 2.4–3.5)
GLUCOSE SERPL-MCNC: 89 MG/DL (ref 74–100)
HAV IGM SERPL QL IA: NONREACTIVE
HBA1C MFR BLD: 5 %
HBV CORE IGM SERPL QL IA: NONREACTIVE
HBV SURFACE AG SERPL QL IA: NONREACTIVE
HCV AB SERPL QL IA: NONREACTIVE
HDLC SERPL-MCNC: 49 MG/DL (ref 35–60)
LDLC SERPL CALC-MCNC: 78 MG/DL (ref 50–140)
N GONORRHOEA DNA SPEC QL NAA+PROBE: NOT DETECTED
POTASSIUM SERPL-SCNC: 3.5 MMOL/L (ref 3.5–5.1)
PROLACTIN LEVEL (OLG): 10.01 NG/ML (ref 5.18–26.53)
PROT SERPL-MCNC: 7.3 GM/DL (ref 6.4–8.3)
SODIUM SERPL-SCNC: 142 MMOL/L (ref 136–145)
SOURCE (OHS): NORMAL
T VAGINALIS VAG QL WET PREP: ABNORMAL
TESTOST SERPL-MCNC: 32.89 NG/DL (ref 13.84–53.35)
TRIGL SERPL-MCNC: 40 MG/DL (ref 37–140)
VLDLC SERPL CALC-MCNC: 8 MG/DL
WBC #/AREA VAG WET PREP: ABNORMAL
YEAST SPEC QL WET PREP: ABNORMAL

## 2024-04-11 PROCEDURE — 36415 COLL VENOUS BLD VENIPUNCTURE: CPT

## 2024-04-11 PROCEDURE — 3078F DIAST BP <80 MM HG: CPT | Mod: CPTII,,,

## 2024-04-11 PROCEDURE — 88174 CYTOPATH C/V AUTO IN FLUID: CPT

## 2024-04-11 PROCEDURE — 83001 ASSAY OF GONADOTROPIN (FSH): CPT

## 2024-04-11 PROCEDURE — 99214 OFFICE O/P EST MOD 30 MIN: CPT | Mod: PBBFAC

## 2024-04-11 PROCEDURE — 80074 ACUTE HEPATITIS PANEL: CPT

## 2024-04-11 PROCEDURE — 80061 LIPID PANEL: CPT

## 2024-04-11 PROCEDURE — 99385 PREV VISIT NEW AGE 18-39: CPT | Mod: S$PBB,,,

## 2024-04-11 PROCEDURE — 81025 URINE PREGNANCY TEST: CPT | Mod: PBBFAC

## 2024-04-11 PROCEDURE — 80053 COMPREHEN METABOLIC PANEL: CPT

## 2024-04-11 PROCEDURE — 87624 HPV HI-RISK TYP POOLED RSLT: CPT

## 2024-04-11 PROCEDURE — 84403 ASSAY OF TOTAL TESTOSTERONE: CPT

## 2024-04-11 PROCEDURE — 87591 N.GONORRHOEAE DNA AMP PROB: CPT

## 2024-04-11 PROCEDURE — 84146 ASSAY OF PROLACTIN: CPT

## 2024-04-11 PROCEDURE — 87210 SMEAR WET MOUNT SALINE/INK: CPT

## 2024-04-11 PROCEDURE — 83036 HEMOGLOBIN GLYCOSYLATED A1C: CPT

## 2024-04-11 PROCEDURE — 1159F MED LIST DOCD IN RCRD: CPT | Mod: CPTII,,,

## 2024-04-11 PROCEDURE — 3075F SYST BP GE 130 - 139MM HG: CPT | Mod: CPTII,,,

## 2024-04-11 PROCEDURE — 3008F BODY MASS INDEX DOCD: CPT | Mod: CPTII,,,

## 2024-04-11 RX ORDER — METRONIDAZOLE 500 MG/1
500 TABLET ORAL 2 TIMES DAILY
Qty: 14 TABLET | Refills: 0 | Status: SHIPPED | OUTPATIENT
Start: 2024-04-11 | End: 2024-04-18

## 2024-04-11 NOTE — TELEPHONE ENCOUNTER
Spoke to patient to inform of results and medication sent to the pharmacy on file. Educated patient on possible hygiene changes. Patient verbalized understanding and had no concerns at this time.

## 2024-04-11 NOTE — PROGRESS NOTES
[facility] -  Gynecology / Women's Health Clinic     Subjective:      Patient ID: Talia Clay is a 38 y.o. female.    Chief Complaint:  Gynecologic Exam      History of Present Illness:  The patient  here for annual exam. Her LMP was 24. Period last 3-5 days and changes pads 2-3x/day, no heavy cycles, regular. Pt admits when starting SLYND 6 months ago, did not have a cycle. Pt admits to spotting between cycles for about 3 weeks. Pt admits to taking POPs at different times, did not know about a window of administration and would not take the last week thinking it was inactive pills like OCPs. Denies history of abnormal paps. Last pap -NIL and HPV neg. Denies breast or urinary complaints. Denies pelvic pain, abnormal bleeding or discharge. Pt reports no STIs in the past and no concerns. Contraception consist of POP (SLYND). Denies tobacco use. Dep. screening 0. Denies fly hx of ovarian, uterine or colon cancer. Mother with breast cancer at 50yrs.    GYN & OB History:  No LMP recorded.     OB History    Para Term  AB Living   3 3 3         SAB IAB Ectopic Multiple Live Births                  # Outcome Date GA Lbr Yayo/2nd Weight Sex Delivery Anes PTL Lv   3 Term      CS-LTranv      2 Term      CS-LTranv      1 Term      CS-LTranv          History reviewed. No pertinent past medical history.     Past Surgical History:   Procedure Laterality Date    ABDOMINOPLASTY  2022     SECTION      CHOLECYSTECTOMY      HEMORRHOID SURGERY      KNEE ARTHROSCOPY Left         Social History     Tobacco Use    Smoking status: Never    Smokeless tobacco: Never   Substance and Sexual Activity    Alcohol use: Yes     Comment: occasional    Drug use: Never    Sexual activity: Not on file        Current Outpatient Medications   Medication Instructions    busPIRone (BUSPAR) 5 mg, Oral, 3 times daily    eszopiclone (LUNESTA) 3 mg, Oral, Nightly    SLYND 4 mg (28) Tab 1  "tablet, Oral       Review of patient's allergies indicates:  No Known Allergies      Review of Systems:  Review of Systems  Negative except for pertinent findings for positives per HPI.     Objective:     Physical Exam   Visit Vitals  /64   Pulse 87   Temp 98.4 °F (36.9 °C) (Oral)   Resp 16   Ht 5' 3" (1.6 m)   Wt 72.2 kg (159 lb 3.2 oz)   SpO2 100%   BMI 28.20 kg/m²       GENERAL: Well-developed female. No acute distress.    SKIN: Normal to inspection, warm and intact.  BREASTS: No rashes or erythema. No masses, lumps, discharge, tenderness.  VULVA: General appearance normal; external genitalia with no lesions or erythema.  VAGINA: Mucosa/vaginal vault pink, no abnormal discharge or lesions.  CERVIX: Pink, parous appearing os, anterior lip seen of cervix, blind pap, high in vault, no erythema or abnormal discharge.  BIMANUAL EXAM: reveals a 10 week-sized uterus. The uterus is non tender. Bilateral adnexa reveal no tenderness.  PSYCHIATRIC: Patient is oriented to person, place, and time. Mood and affect are normal.    Assessment:       ICD-10-CM ICD-9-CM   1. Encounter for Papanicolaou smear for cervical cancer screening  Z12.4 V76.2   2. Encounter for screening mammogram for breast cancer  Z12.31 V76.12   3. Abnormal uterine bleeding  N93.9 626.9       Plan:     1. Encounter for Papanicolaou smear for cervical cancer screening  -     Ambulatory referral/consult to Gynecology  -     Liquid-Based Pap Smear, Screening Screening    2. Encounter for screening mammogram for breast cancer  -     Mammo Digital Screening Bilat w/ Willem; Future; Expected date: 05/21/2024    3. Abnormal uterine bleeding  -     Chlamydia/GC, PCR  -     Wet Prep, Genital  -     Prolactin; Future; Expected date: 04/11/2024  -     Testosterone; Future; Expected date: 04/11/2024  -     Follicle Stimulating Hormone; Future; Expected date: 04/11/2024  -     POCT urine pregnancy    Pap today  MG ordered   UPT - neg  Wet prep; G/C  Irregular " cycles - Prolactin, testosterone, FSH  SLYND - Must take all 28 pills, as all pills have active hormone. If even 3 hours late, take pill as soon as possible and use back up method for next 2 days. Instructed to take at the same time each day and use back up such as condoms for first month of pills. Instructions on when to start and what to do if she misses a pill. Discussed usual side effects and needs for back up birth control. Reminded patient condoms should be used to prevent STDs.   Call if any irregular bleeding once using POPs correctly and taking all pills.  Follow up in about 1 year (around 4/11/2025) for Annual.

## 2024-04-11 NOTE — TELEPHONE ENCOUNTER
Swab showed clue cells indicating bacterial vaginosis. Not an STD but an infection in the vaginal area when pH is disrupted. Rx sent for Flagyl. No alcohol during and for 48-72 hours after treatment. Use unscented soap and no scented products. More showers than baths. No douching.

## 2024-04-15 LAB
HIGH RISK HPV: NEGATIVE
PSYCHE PATHOLOGY RESULT: NORMAL

## 2024-04-24 ENCOUNTER — TELEPHONE (OUTPATIENT)
Dept: GYNECOLOGY | Facility: CLINIC | Age: 39
End: 2024-04-24
Payer: MEDICAID

## 2024-04-24 NOTE — TELEPHONE ENCOUNTER
Return call for patient,  verified. Pt concerned about irregular bleeding spotting for 2+ months. At annual visit, pt was c/o irregular bleeding/spotting, admitted to taking POPs and taking at different time and missing the last week d/t thinking they were inactive pills for over 1 yr. Pt UPT at visit was neg. Encouraged pt to take POPs daily at the same time, with the 3 hour window, and taking all pills in pack. Pt now c/o irregular bleeding continued and requesting for IUD placement.    Discussed options for medical management of AUB with pt. Depo, Provera and Mirena IUD. Depo-pt informed that will likely control bleeding either with amenorrhea or lighten cycles, potential weight gain. Mirena IUD also likely best option for medical management. Limited systemic absorption, possible amenorrhea or lighter cycles and coverage for 5 years. Provera-pt informed that will likely control bleeding either with amenorrhea or lighten cycles. Discussed with pt that Provera does not provide contraception, continue condom use to prevent pregnancy, Provera is a hormone, still risk of blood clots, MI and CVA.  Pt requested IUD placement and states she understands risk and wishes to proceed.    Will set appt up with GYN residents for IUD placement for contraception.

## 2024-05-24 ENCOUNTER — PROCEDURE VISIT (OUTPATIENT)
Dept: GYNECOLOGY | Facility: CLINIC | Age: 39
End: 2024-05-24
Payer: MEDICAID

## 2024-05-24 VITALS
HEIGHT: 63 IN | TEMPERATURE: 98 F | OXYGEN SATURATION: 100 % | RESPIRATION RATE: 18 BRPM | DIASTOLIC BLOOD PRESSURE: 79 MMHG | HEART RATE: 89 BPM | SYSTOLIC BLOOD PRESSURE: 118 MMHG | WEIGHT: 164 LBS | BODY MASS INDEX: 29.06 KG/M2

## 2024-05-24 DIAGNOSIS — Z30.430 ENCOUNTER FOR IUD INSERTION: Primary | ICD-10-CM

## 2024-05-24 PROCEDURE — 58300 INSERT INTRAUTERINE DEVICE: CPT | Mod: PBBFAC | Performed by: STUDENT IN AN ORGANIZED HEALTH CARE EDUCATION/TRAINING PROGRAM

## 2024-05-24 RX ADMIN — LEVONORGESTREL 18.6 MCG: 52 INTRAUTERINE DEVICE INTRAUTERINE at 08:05

## 2024-05-24 NOTE — PROCEDURES
Insertion of IUD    Date/Time: 5/24/2024 8:45 AM    Performed by: Sobia Lunsford MD  Authorized by: Sobia Lunsford MD    Consent:     Consent obtained:  Prior to procedure the appropriate consent was completed and verified    Consent given by:  Patient    Procedure risks and benefits discussed: yes      Patient questions answered: yes      Educational handouts given: yes    Insertion Procedure:   18.6 mcg levonorgestreL 20.4 mcg/24 hr (8 yrs) 52 mg       Pelvic exam performed: yes      Negative urine pregnancy test: yes      Cervix cleaned and prepped: yes      Speculum placed in vagina: yes      Tenaculum applied to cervix: yes      Uterus sounded: yes      Uterus sound depth (cm):  9    IUD inserted with no complications: yes      IUD type:  Liletta    Strings trimmed: yes    Post-procedure:     Patient tolerated procedure well: yes      Sobia Lunsford MD, PGY-4  [facility]  05/24/2024 9:51 AM

## 2024-06-28 ENCOUNTER — HOSPITAL ENCOUNTER (OUTPATIENT)
Dept: PREADMISSION TESTING | Facility: HOSPITAL | Age: 39
Discharge: HOME | End: 2024-06-28
Attending: SURGERY
Payer: MEDICAID

## 2024-06-28 ENCOUNTER — HOSPITAL ENCOUNTER (OUTPATIENT)
Dept: RADIOLOGY | Facility: HOSPITAL | Age: 39
Discharge: HOME | End: 2024-06-28
Attending: SURGERY
Payer: MEDICAID

## 2024-06-28 VITALS — WEIGHT: 171 LBS | HEIGHT: 64 IN | BODY MASS INDEX: 29.19 KG/M2

## 2024-06-28 DIAGNOSIS — Z12.11 SCREENING FOR COLON CANCER: Primary | ICD-10-CM

## 2024-06-28 DIAGNOSIS — Z01.818 PREOP TESTING: ICD-10-CM

## 2024-06-28 DIAGNOSIS — Z12.11 COLON CANCER SCREENING: ICD-10-CM

## 2024-06-28 PROCEDURE — 93005 ELECTROCARDIOGRAM TRACING: CPT

## 2024-06-28 PROCEDURE — 71046 X-RAY EXAM CHEST 2 VIEWS: CPT | Mod: TC

## 2024-06-28 PROCEDURE — 93010 ELECTROCARDIOGRAM REPORT: CPT | Mod: ,,, | Performed by: INTERNAL MEDICINE

## 2024-06-28 RX ORDER — SODIUM CHLORIDE, SODIUM LACTATE, POTASSIUM CHLORIDE, CALCIUM CHLORIDE 600; 310; 30; 20 MG/100ML; MG/100ML; MG/100ML; MG/100ML
INJECTION, SOLUTION INTRAVENOUS CONTINUOUS
Status: CANCELLED | OUTPATIENT
Start: 2024-07-05

## 2024-06-28 NOTE — DISCHARGE INSTRUCTIONS

## 2024-07-01 LAB
OHS QRS DURATION: 76 MS
OHS QTC CALCULATION: 430 MS

## 2024-07-03 ENCOUNTER — ANESTHESIA EVENT (OUTPATIENT)
Dept: GASTROENTEROLOGY | Facility: HOSPITAL | Age: 39
End: 2024-07-03
Payer: MEDICAID

## 2024-07-03 NOTE — ANESTHESIA PREPROCEDURE EVALUATION
2024  Talia Clay is a 39 y.o., female.  Anesthesia History    No medical history recorded     Surgical History    ABDOMINOPLASTY HEMORRHOID SURGERY   KNEE ARTHROSCOPY  SECTION   CHOLECYSTECTOMY      COVID-19 Risk of Complications  Current as of about an hour ago    0 0 to < 3 Points: Low Risk   3 to < 6 Points: Medium Risk   6 to 16 Points: High Risk     Last Change: N/A      Details   Substance History    Smoking Status: Never   Smokeless Tobacco Status: Never   Alcohol use: Yes, unspecified volume   Drug use: Never     Anesthesia Family History    Problem Relations (Age of Onset)   No history of this type found      Current Gender Identity    Questions Responses   Patient's Gender Identity: Female   Patient's sex assigned at birth: Female          Pre-op Assessment    I have reviewed the Patient Summary Reports.     I have reviewed the Nursing Notes. I have reviewed the NPO Status.   I have reviewed the Medications.     Review of Systems  Anesthesia Hx:  No problems with previous Anesthesia             Denies Family Hx of Anesthesia complications.    Denies Personal Hx of Anesthesia complications.                    Social:  Alcohol Use       Hematology/Oncology:  Hematology Normal   Oncology Normal                                   EENT/Dental:  EENT/Dental Normal           Cardiovascular:  Cardiovascular Normal Exercise tolerance: poor                                           Pulmonary:  Pulmonary Normal                       Renal/:  Renal/ Normal                 Hepatic/GI:  Hepatic/GI Normal                 Musculoskeletal:  Musculoskeletal Normal                Neurological:  Neurology Normal                                      Endocrine:  Endocrine Normal          Obesity / BMI > 30  Dermatological:  Skin Normal    Psych:  Psychiatric Normal                    Physical  Exam  General: Well nourished, Cooperative, Alert and Oriented    Airway:  Mallampati: II / II  Mouth Opening: Normal  TM Distance: Normal  Tongue: Normal  Neck ROM: Normal ROM    Dental:  Intact        Anesthesia Plan  Type of Anesthesia, risks & benefits discussed:    Anesthesia Type: MAC  Intra-op Monitoring Plan: Standard ASA Monitors  Post Op Pain Control Plan: multimodal analgesia  Induction:  IV  Informed Consent: Informed consent signed with the Patient and all parties understand the risks and agree with anesthesia plan.  All questions answered. Patient consented to blood products? Yes  ASA Score: 3  Day of Surgery Review of History & Physical: H&P Update referred to the surgeon/provider.I have interviewed and examined the patient. I have reviewed the patient's H&P dated: There are no significant changes.     Ready For Surgery From Anesthesia Perspective.     .

## 2024-07-05 ENCOUNTER — HOSPITAL ENCOUNTER (OUTPATIENT)
Dept: GASTROENTEROLOGY | Facility: HOSPITAL | Age: 39
Discharge: HOME | End: 2024-07-05
Attending: SURGERY
Payer: MEDICAID

## 2024-07-05 ENCOUNTER — ANESTHESIA (OUTPATIENT)
Dept: GASTROENTEROLOGY | Facility: HOSPITAL | Age: 39
End: 2024-07-05
Payer: MEDICAID

## 2024-07-05 VITALS
BODY MASS INDEX: 29.19 KG/M2 | TEMPERATURE: 98 F | SYSTOLIC BLOOD PRESSURE: 133 MMHG | HEIGHT: 64 IN | WEIGHT: 171 LBS | RESPIRATION RATE: 18 BRPM | DIASTOLIC BLOOD PRESSURE: 83 MMHG | HEART RATE: 62 BPM | OXYGEN SATURATION: 100 %

## 2024-07-05 DIAGNOSIS — Z12.11 COLON CANCER SCREENING: Primary | ICD-10-CM

## 2024-07-05 DIAGNOSIS — K64.9 HEMORRHOIDS, UNSPECIFIED HEMORRHOID TYPE: ICD-10-CM

## 2024-07-05 DIAGNOSIS — Z12.11 SCREENING FOR COLON CANCER: ICD-10-CM

## 2024-07-05 LAB — B-HCG UR QL: NEGATIVE

## 2024-07-05 PROCEDURE — 25000003 PHARM REV CODE 250: Performed by: NURSE ANESTHETIST, CERTIFIED REGISTERED

## 2024-07-05 PROCEDURE — 81025 URINE PREGNANCY TEST: CPT | Performed by: SURGERY

## 2024-07-05 PROCEDURE — 63600175 PHARM REV CODE 636 W HCPCS: Performed by: SURGERY

## 2024-07-05 PROCEDURE — 63600175 PHARM REV CODE 636 W HCPCS: Performed by: NURSE ANESTHETIST, CERTIFIED REGISTERED

## 2024-07-05 PROCEDURE — 37000008 HC ANESTHESIA 1ST 15 MINUTES

## 2024-07-05 PROCEDURE — 37000009 HC ANESTHESIA EA ADD 15 MINS

## 2024-07-05 RX ORDER — SODIUM CHLORIDE, SODIUM LACTATE, POTASSIUM CHLORIDE, CALCIUM CHLORIDE 600; 310; 30; 20 MG/100ML; MG/100ML; MG/100ML; MG/100ML
INJECTION, SOLUTION INTRAVENOUS CONTINUOUS
Status: DISCONTINUED | OUTPATIENT
Start: 2024-07-05 | End: 2024-07-06 | Stop reason: HOSPADM

## 2024-07-05 RX ORDER — SODIUM CHLORIDE 9 MG/ML
INJECTION, SOLUTION INTRAVENOUS CONTINUOUS
Status: CANCELLED | OUTPATIENT
Start: 2024-07-05

## 2024-07-05 RX ORDER — PROPOFOL 10 MG/ML
VIAL (ML) INTRAVENOUS
Status: DISCONTINUED | OUTPATIENT
Start: 2024-07-05 | End: 2024-07-05

## 2024-07-05 RX ORDER — LIDOCAINE HYDROCHLORIDE 20 MG/ML
INJECTION INTRAVENOUS
Status: DISCONTINUED | OUTPATIENT
Start: 2024-07-05 | End: 2024-07-05

## 2024-07-05 RX ADMIN — LIDOCAINE HYDROCHLORIDE 100 MG: 20 INJECTION, SOLUTION INTRAVENOUS at 09:07

## 2024-07-05 RX ADMIN — PROPOFOL 110 MG: 10 INJECTION, EMULSION INTRAVENOUS at 09:07

## 2024-07-05 RX ADMIN — SODIUM CHLORIDE, POTASSIUM CHLORIDE, SODIUM LACTATE AND CALCIUM CHLORIDE: 600; 310; 30; 20 INJECTION, SOLUTION INTRAVENOUS at 08:07

## 2024-07-05 NOTE — DISCHARGE SUMMARY
[facility]  [facility]    Colonoscopy      OUTCOME: Patient tolerated treatment/procedure well without complication and is now ready for discharge.    DISPOSITION: Home or Self Care        FINAL DIAGNOSIS:  Colon cancer screening  Normal Ileum up to 20 cm   Normal cecum ascending transverse and descending colon   Diverticulosis of the sigmoid colon from 30-15 cm   Grade 2-3 external hemorrhoids status post successful PPH stapling  FOLLOWUP: In clinic 1 week    DISCHARGE INSTRUCTIONS:    Discharge Procedure Orders   Diet general        TIME SPENT ON DISCHARGE:  5 minutes

## 2024-07-05 NOTE — PLAN OF CARE
Patient returned to the floor from surgery via stretcher accompanied by RN. Patient in stable condition and denies further needs at this time.

## 2024-07-05 NOTE — ANESTHESIA POSTPROCEDURE EVALUATION
Anesthesia Post Evaluation    Patient: Talia Clay    Procedure(s) Performed: * No procedures listed *    Final Anesthesia Type: MAC      Patient location during evaluation: floor  Patient participation: Yes- Able to Participate  Level of consciousness: awake and alert, awake and oriented  Post-procedure vital signs: reviewed and stable  Pain management: adequate  Airway patency: patent    PONV status at discharge: No PONV  Anesthetic complications: no      Cardiovascular status: blood pressure returned to baseline  Respiratory status: unassisted, room air and spontaneous ventilation  Hydration status: euvolemic  Follow-up not needed.              Vitals Value Taken Time   /72 07/05/24 0936   Temp 98 07/05/24 0936   Pulse 69 07/05/24 0936   Resp 15 07/05/24 0936   SpO2 99 07/05/24 0936         No case tracking events are documented in the log.      Pain/Bryan Score: No data recorded

## 2024-07-05 NOTE — DISCHARGE INSTRUCTIONS
-Limit your activity today  -Diet: As tolerated  -Do not drive/operate machinery for the next 24 hours.   -Keep follow-up appointments/inform your provider if you are not able to make your follow-up.  -Contact your provider if you experience:   Fever of 100.4°F (38°C) or higher, chills  Bleeding during bowel movements (1 teaspoon (5 mL) or more) or maroon stool  Vomiting blood  Throwing up more than 3 times in the next 48 hours  Feeling dizzy/weak  Belly pain/nausea that is getting worse or not reduced with medications  Not able to have a bowel movement for more than 2 days  Hard swollen belly

## 2024-07-05 NOTE — PLAN OF CARE
Provided patient with discharge education focusing on activity, diet, worsening signs/symptoms, taking medications, and keeping follow-up appointment. Patient verbalized understanding and denies further questions at this time. Patient discharged from the floor. Patient in stable condition and denies further needs at this time.

## 2024-07-12 DIAGNOSIS — K43.2 INCISIONAL HERNIA, WITHOUT OBSTRUCTION OR GANGRENE: Primary | ICD-10-CM

## 2024-07-19 ENCOUNTER — OFFICE VISIT (OUTPATIENT)
Dept: URGENT CARE | Facility: CLINIC | Age: 39
End: 2024-07-19
Payer: MEDICAID

## 2024-07-19 VITALS
DIASTOLIC BLOOD PRESSURE: 87 MMHG | RESPIRATION RATE: 20 BRPM | HEIGHT: 64 IN | OXYGEN SATURATION: 100 % | WEIGHT: 168 LBS | SYSTOLIC BLOOD PRESSURE: 123 MMHG | TEMPERATURE: 100 F | HEART RATE: 96 BPM | BODY MASS INDEX: 28.68 KG/M2

## 2024-07-19 DIAGNOSIS — J06.9 UPPER RESPIRATORY TRACT INFECTION, UNSPECIFIED TYPE: ICD-10-CM

## 2024-07-19 DIAGNOSIS — U07.1 COVID-19: Primary | ICD-10-CM

## 2024-07-19 LAB
CTP QC/QA: YES
SARS-COV-2 RDRP RESP QL NAA+PROBE: POSITIVE

## 2024-07-19 PROCEDURE — 87635 SARS-COV-2 COVID-19 AMP PRB: CPT | Mod: PBBFAC

## 2024-07-19 PROCEDURE — 99214 OFFICE O/P EST MOD 30 MIN: CPT | Mod: PBBFAC

## 2024-07-19 PROCEDURE — 99214 OFFICE O/P EST MOD 30 MIN: CPT | Mod: S$PBB,,,

## 2024-07-19 RX ORDER — KETOCONAZOLE 20 MG/ML
SHAMPOO, SUSPENSION TOPICAL
COMMUNITY
Start: 2024-03-25

## 2024-07-19 RX ORDER — PROMETHAZINE HYDROCHLORIDE AND DEXTROMETHORPHAN HYDROBROMIDE 6.25; 15 MG/5ML; MG/5ML
5 SYRUP ORAL EVERY 8 HOURS PRN
Qty: 118 ML | Refills: 0 | Status: SHIPPED | OUTPATIENT
Start: 2024-07-19 | End: 2024-07-29

## 2024-07-19 RX ORDER — CLOBETASOL PROPIONATE 0.5 MG/G
CREAM TOPICAL
COMMUNITY
Start: 2024-07-14

## 2024-07-19 RX ORDER — TRIAMCINOLONE ACETONIDE 1 MG/G
CREAM TOPICAL
COMMUNITY
Start: 2024-07-08

## 2024-07-19 NOTE — LETTER
July 19, 2024      [facility]  [facility] [facility] [address]  Phone: [phone]       Patient: Talia Clay   YOB: 1985  Date of Visit: 07/19/2024    To Whom It May Concern:    Micha Clay  was at [facility] on 07/19/2024. The patient may return to work/school on 07/23/2024 with no restrictions. If you have any questions or concerns, or if I can be of further assistance, please do not hesitate to contact me.    Sincerely,    CHLOÉ Foley

## 2024-07-19 NOTE — PROGRESS NOTES
"Subjective:       Patient ID: Talia Clay is a 39 y.o. female.    Vitals:  height is 5' 4" (1.626 m) and weight is 76.2 kg (168 lb). Her oral temperature is 100.2 °F (37.9 °C). Her blood pressure is 123/87 and her pulse is 96. Her respiration is 20 and oxygen saturation is 100%.     Chief Complaint: URI (Cough, chills, fever, headache, fatigue and nasal congestion.)    40 y/o AAF presents to clinic with ill daughter, reports symptoms worsening on yesterday. She denies any known ill exposures. Has tried Motrin for HA symptoms with relief.     URI   Associated symptoms include congestion, coughing, headaches and nausea. Pertinent negatives include no chest pain, diarrhea, sore throat or vomiting.       Constitution: Positive for chills, fatigue and fever.   HENT:  Positive for congestion. Negative for sore throat.    Cardiovascular:  Negative for chest pain.   Respiratory:  Positive for cough. Negative for shortness of breath.    Gastrointestinal:  Positive for nausea. Negative for vomiting and diarrhea.   Musculoskeletal:  Positive for muscle ache.   Neurological:  Positive for headaches.       Objective:      Physical Exam   Constitutional: She is oriented to person, place, and time. She appears well-developed. She is cooperative. She is easily aroused.  Non-toxic appearance. She does not appear ill. normal and well-groomedawake  HENT:   Head: Normocephalic and atraumatic.   Ears:   Right Ear: Tympanic membrane normal.   Left Ear: Tympanic membrane normal.   Nose: Mucosal edema and rhinorrhea present. Right sinus exhibits no maxillary sinus tenderness and no frontal sinus tenderness. Left sinus exhibits no maxillary sinus tenderness and no frontal sinus tenderness.   Mouth/Throat: Uvula is midline, oropharynx is clear and moist and mucous membranes are normal. Tonsils are 1+ on the right. Tonsils are 1+ on the left. No tonsillar exudate.   Eyes: Conjunctivae and lids are normal.   Neck: Neck supple. "   Cardiovascular: Normal rate, S1 normal, S2 normal and normal heart sounds.   Pulmonary/Chest: Effort normal and breath sounds normal.   Abdominal: Normal appearance.   Musculoskeletal:      Right lower leg: No edema.      Left lower leg: No edema.   Lymphadenopathy:     She has no cervical adenopathy.   Neurological: She is alert, oriented to person, place, and time and easily aroused. Gait normal. GCS eye subscore is 4. GCS verbal subscore is 5. GCS motor subscore is 6.   Skin: Skin is warm, dry, intact and not diaphoretic. Capillary refill takes less than 2 seconds.   Psychiatric: Her speech is normal and behavior is normal.   Nursing note and vitals reviewed.        Assessment:       1. COVID-19    2. Upper respiratory tract infection, unspecified type          Plan:     Covid test is positive, patient denies any complications with COVID in the past. Encouraged to remain  hydrated, alternate Tylenol/Motrin, sedative effects discussed with Phenergan DM. Strict ED precautions discussed, she appears stable for discharge.     COVID-19  -     promethazine-dextromethorphan (PROMETHAZINE-DM) 6.25-15 mg/5 mL Syrp; Take 5 mLs by mouth every 8 (eight) hours as needed (cough).  Dispense: 118 mL; Refill: 0    Upper respiratory tract infection, unspecified type  -     POCT COVID-19 Rapid Screening           Results for orders placed or performed in visit on 07/19/24   POCT COVID-19 Rapid Screening   Result Value Ref Range    POC Rapid COVID Positive (A) Negative     Acceptable Yes

## 2024-07-19 NOTE — PATIENT INSTRUCTIONS
CDC RECOMMENDATIONS     If You Test Positive for COVID-19 (Isolate)  Everyone, regardless of vaccination status.    Stay home for 5 days.  If you have no symptoms or your symptoms are resolving after 5 days, you can leave your house.  Continue to wear a mask around others for 5 additional days.  If you have a fever, continue to stay home until your fever resolves.

## 2024-07-25 ENCOUNTER — TELEPHONE (OUTPATIENT)
Dept: INTERNAL MEDICINE | Facility: CLINIC | Age: 39
End: 2024-07-25
Payer: MEDICAID

## 2024-07-25 ENCOUNTER — HOSPITAL ENCOUNTER (OUTPATIENT)
Dept: RADIOLOGY | Facility: HOSPITAL | Age: 39
Discharge: HOME | End: 2024-07-25
Attending: SURGERY
Payer: MEDICAID

## 2024-07-25 ENCOUNTER — OFFICE VISIT (OUTPATIENT)
Dept: INTERNAL MEDICINE | Facility: CLINIC | Age: 39
End: 2024-07-25
Payer: MEDICAID

## 2024-07-25 VITALS
RESPIRATION RATE: 18 BRPM | DIASTOLIC BLOOD PRESSURE: 81 MMHG | WEIGHT: 164.38 LBS | OXYGEN SATURATION: 100 % | SYSTOLIC BLOOD PRESSURE: 119 MMHG | HEIGHT: 64 IN | HEART RATE: 92 BPM | TEMPERATURE: 99 F | BODY MASS INDEX: 28.06 KG/M2

## 2024-07-25 DIAGNOSIS — K43.2 INCISIONAL HERNIA, WITHOUT OBSTRUCTION OR GANGRENE: ICD-10-CM

## 2024-07-25 DIAGNOSIS — L50.9 URTICARIA: ICD-10-CM

## 2024-07-25 DIAGNOSIS — Z12.11 COLON CANCER SCREENING: Primary | ICD-10-CM

## 2024-07-25 DIAGNOSIS — K64.9 HEMORRHOIDS, UNSPECIFIED HEMORRHOID TYPE: ICD-10-CM

## 2024-07-25 PROCEDURE — 99214 OFFICE O/P EST MOD 30 MIN: CPT | Mod: PBBFAC,25 | Performed by: INTERNAL MEDICINE

## 2024-07-25 PROCEDURE — 76705 ECHO EXAM OF ABDOMEN: CPT | Mod: TC

## 2024-07-25 NOTE — PROGRESS NOTES
[facility] INTERNAL MEDICINE  OUTPATIENT OFFICE VISIT NOTE    SUBJECTIVE:      HPI: Talia Clay is a 39 y.o. female here today for f/u  She was diagnosed with COVID secondary to a cough approximately 6 days ago.  She states that she still has a mild cough however she had no longer has a fever.  She was seen at an urgent care but did not have Paxlovid prescribed.  She recently underwent a colonoscopy.  She was seeing the same general surgeon for what appeared to be a hernia in her anterior abdominal wall.  She did undergo an ultrasound this morning however the results are not available to us today.  She will follow-up with Dr. Seals for that.  She is also concerned about some hemorrhoids.  He had done surgery on those a while back and she will follow-up with him.  Today she is concerned about an allergic reaction she gets when she gets bitten.  She states that following a bite she notices a very large area of redness that takes several days to subside.  She is amenable to seeing an allergy specialist    Current Outpatient Medications   Medication Instructions    busPIRone (BUSPAR) 5 mg, Oral, 3 times daily    clobetasoL (TEMOVATE) 0.05 % cream Topical (Top)    eszopiclone (LUNESTA) 3 mg, Oral, Nightly    ketoconazole (NIZORAL) 2 % shampoo Topical (Top), Three times weekly    promethazine-dextromethorphan (PROMETHAZINE-DM) 6.25-15 mg/5 mL Syrp 5 mLs, Oral, Every 8 hours PRN    triamcinolone acetonide 0.1% (KENALOG) 0.1 % cream Apply to affected area as directed       ROS:  CONSTITUTIONAL: Denies weight loss, fever and chills.  HEENT: Denies changes in vision and hearing.  ?RESPIRATORY: Denies SOB and cough.?  CV: Denies palpitations and CP. ?  GI: Denies abdominal pain, nausea, vomiting and diarrhea.?  : Denies dysuria and urinary frequency.?  MSK: Denies myalgia and joint pain.?  SKIN: Denies rash and pruritus.  ?NEUROLOGICAL: Denies headache and syncope.?  PSYCHIATRIC: Denies recent changes in mood. Denies  "anxiety and depression.     OBJECTIVE:     Visit Vitals  /81 (BP Location: Left arm, Patient Position: Sitting, BP Method: Large (Automatic))   Pulse 92   Temp 98.7 °F (37.1 °C) (Oral)   Resp 18   Ht 5' 4" (1.626 m)   Wt 74.6 kg (164 lb 6.4 oz)   SpO2 100%   BMI 28.22 kg/m²        General: Well nourished w/o distress  HEENT: NC/AT; PERRLA; nasal and oral mucosa moist and clear; no sinus tenderness; no thyromegaly  Neck: Full ROM; no lymphadenopathy  Pulm: CTA bilaterally, normal work of breathing  CV: S1, S2 w/o murmurs or gallops; no edema noted  GI: Soft with normal bowel sounds in all quadrants, no masses on palpation  MSK: Full ROM of all extremities and spine w/o limitation or discomfort  Derm: No rashes, abnormal bruising, or skin lesions  Neuro: AAOx4; CN II-XII intact; motor/sensory function intact  Psych: Cooperative; appropriate mood and affect       ASSESSMENT & PLAN:     Vaginal bleeding  Seeing a gynecologist    2. Colonoscopy  Reviewed results    3. Allergic reaction to bites  Referred to Dr. Freitas    4. Possible ventral hernia  Awaiting results of ultrasound    5. Hemorrhoids  She will follow-up with Dr Seals      1. Colon cancer screening    2. Hemorrhoids, unspecified hemorrhoid type           Follow up in about 6 months (around 1/25/2025).     Future Appointments   Date Time Provider Department Center   1/23/2025  1:00 PM Anusha Lopez MD [facility] RES Cl    4/17/2025  8:30 AM Marisol Weir FNP [facility] GYN Cl Un        Anusha Lopez MD    "

## 2024-08-06 DIAGNOSIS — R10.13 DYSPEPSIA: ICD-10-CM

## 2024-08-06 DIAGNOSIS — R10.13 DYSPEPSIA: Primary | ICD-10-CM

## 2024-08-06 DIAGNOSIS — K43.2 RECURRENT VENTRAL HERNIA: Primary | ICD-10-CM

## 2024-08-06 DIAGNOSIS — K43.2 HERNIA, INCISIONAL: ICD-10-CM

## 2024-09-06 ENCOUNTER — HOSPITAL ENCOUNTER (OUTPATIENT)
Dept: RADIOLOGY | Facility: HOSPITAL | Age: 39
Discharge: HOME | End: 2024-09-06
Attending: SURGERY
Payer: MEDICAID

## 2024-09-06 DIAGNOSIS — R10.13 DYSPEPSIA: ICD-10-CM

## 2024-09-06 DIAGNOSIS — K43.2 RECURRENT VENTRAL HERNIA: ICD-10-CM

## 2024-09-06 PROCEDURE — 74176 CT ABD & PELVIS W/O CONTRAST: CPT | Mod: TC

## 2024-09-23 ENCOUNTER — OFFICE VISIT (OUTPATIENT)
Dept: GYNECOLOGY | Facility: CLINIC | Age: 39
End: 2024-09-23
Payer: MEDICAID

## 2024-09-23 VITALS
HEART RATE: 64 BPM | HEIGHT: 64 IN | RESPIRATION RATE: 18 BRPM | OXYGEN SATURATION: 100 % | BODY MASS INDEX: 27.83 KG/M2 | WEIGHT: 163 LBS | SYSTOLIC BLOOD PRESSURE: 117 MMHG | DIASTOLIC BLOOD PRESSURE: 78 MMHG | TEMPERATURE: 99 F

## 2024-09-23 DIAGNOSIS — T83.9XXA: Primary | ICD-10-CM

## 2024-09-23 PROCEDURE — 99213 OFFICE O/P EST LOW 20 MIN: CPT | Mod: PBBFAC

## 2024-09-24 NOTE — PROGRESS NOTES
"  [facility] GYNECOLOGY CLINIC NOTE     Talia Clay is a 39 y.o.  presenting to GYN clinic for evaluation of problems with her IUD. Patient has had liletta IUD in place for last 4 months for contraception and menorrhagia. She reports her periods are greatly improved, with only light spotting a few days each month. Reports some occasional crampy left sided pain after sex. Also reports her  feels the "device is poking him" and would like for her to have it removed. She is otherwise pleased with this method of contraception and management of bleeding symptoms and would like to retain her IUD.     OB History          3    Para   3    Term   3            AB        Living             SAB        IAB        Ectopic        Multiple        Live Births                    CS x 3  History reviewed. No pertinent past medical history.   Past Surgical History:   Procedure Laterality Date    ABDOMINOPLASTY  2022     SECTION      CHOLECYSTECTOMY      HEMORRHOID SURGERY      KNEE ARTHROSCOPY Left       Current Outpatient Medications   Medication Instructions    busPIRone (BUSPAR) 5 mg, Oral, 3 times daily    clobetasoL (TEMOVATE) 0.05 % cream Topical (Top)    eszopiclone (LUNESTA) 3 mg, Oral, Nightly    ketoconazole (NIZORAL) 2 % shampoo Topical (Top), Three times weekly    triamcinolone acetonide 0.1% (KENALOG) 0.1 % cream Apply to affected area as directed     Social History     Tobacco Use    Smoking status: Never     Passive exposure: Never    Smokeless tobacco: Never   Substance Use Topics    Alcohol use: Yes     Comment: occasional    Drug use: Never       Review of Systems  Pertinent items are noted in HPI.     Objective:     /78   Pulse 64   Temp 98.5 °F (36.9 °C) (Oral)   Resp 18   Ht 5' 4" (1.626 m)   Wt 73.9 kg (163 lb)   LMP  (LMP Unknown)   SpO2 100%   BMI 27.98 kg/m²   Physical Exam:  Gen: Well-nourished, well-developed female appearing stated age. Alert, " cooperative, in no acute distress.  Extrem: Extremities normal, atraumatic, non-tender calves.  External genitalia: Normal female genitalia without lesion, discharge or tenderness.   Speculum Exam: Vaginal vault without discharge, nonodorous, no lesions/masses seen.  Cervix very high in vault, os visualized as closed, no lesions/masses. IUD strings visible, looped around cervix.    Note: RN chaperone present for entirety of exam.    Assessment:       39 y.o.  here for concerns regarding IUD .  1. Symptom related to intrauterine device (IUD), initial encounter          Plan:     Patient overall without satisfied with IUD. Desires to continue this method. IUD strings trimmed. Discussed this will likely cause strings to be lost. Discussed removal will likely require need to be under ultrasound guidance or removed in the operating room. Patient acknowledged understanding and desired trimming strings. RTC in 3 months for IUD removal if desires at that time.     Problem List Items Addressed This Visit          Renal/    Symptom related to intrauterine device (IUD) - Primary       Return to clinic in 3 months or sooner prn     Discussed patient and plan with Dr. Provost Jaguar Hendrix MD  [employer] [facility]  PGY-4          No complaints

## 2024-11-15 ENCOUNTER — HOSPITAL ENCOUNTER (OUTPATIENT)
Dept: RADIOLOGY | Facility: HOSPITAL | Age: 39
Discharge: HOME | End: 2024-11-15
Payer: MEDICAID

## 2024-11-15 DIAGNOSIS — Z12.31 ENCOUNTER FOR SCREENING MAMMOGRAM FOR BREAST CANCER: ICD-10-CM

## 2024-11-15 PROCEDURE — 77063 BREAST TOMOSYNTHESIS BI: CPT | Mod: TC
